# Patient Record
Sex: FEMALE | URBAN - NONMETROPOLITAN AREA
[De-identification: names, ages, dates, MRNs, and addresses within clinical notes are randomized per-mention and may not be internally consistent; named-entity substitution may affect disease eponyms.]

---

## 2023-07-17 LAB
EXTERNAL ABO GROUPING: NORMAL
EXTERNAL ANTIBODY SCREEN: NORMAL
EXTERNAL HEMATOCRIT: 42 %
EXTERNAL HEMOGLOBIN: 14 G/DL
EXTERNAL HEPATITIS B SURFACE ANTIGEN: NEGATIVE
EXTERNAL PLATELET COUNT: 296 K/ΜL
EXTERNAL RH FACTOR: POSITIVE
EXTERNAL SYPHILIS RPR SCREEN: NORMAL
HCV AB S/CO SERPL IA: NORMAL
HIV 1+2 AB+HIV1 P24 AG SERPL QL IA: NORMAL
RUBV IGG SERPL IA-ACNC: NORMAL
VZV IGG SER QL: 610.6

## 2023-07-31 LAB
2ND TRIMESTER 4 SCREEN SERPL-IMP: NEGATIVE
C TRACH RRNA SPEC DONR QL NAA+PROBE: NEGATIVE
N GONORRHOEA DNA SPEC QL NAA+PROBE: NEGATIVE

## 2023-08-03 ENCOUNTER — PATIENT OUTREACH (OUTPATIENT)
Dept: LABOR AND DELIVERY | Facility: HOSPITAL | Age: 28
End: 2023-08-03

## 2023-08-03 ENCOUNTER — REFERRAL TRIAGE (OUTPATIENT)
Dept: LABOR AND DELIVERY | Facility: HOSPITAL | Age: 28
End: 2023-08-03

## 2023-10-04 ENCOUNTER — INITIAL PRENATAL (OUTPATIENT)
Dept: OBSTETRICS AND GYNECOLOGY | Facility: CLINIC | Age: 28
End: 2023-10-04
Payer: COMMERCIAL

## 2023-10-04 VITALS
HEIGHT: 63 IN | SYSTOLIC BLOOD PRESSURE: 122 MMHG | DIASTOLIC BLOOD PRESSURE: 74 MMHG | WEIGHT: 191 LBS | BODY MASS INDEX: 33.84 KG/M2

## 2023-10-04 DIAGNOSIS — Z36.3 SCREENING, ANTENATAL, FOR MALFORMATION BY ULTRASOUND: ICD-10-CM

## 2023-10-04 DIAGNOSIS — O26.892 PELVIC PAIN AFFECTING PREGNANCY IN SECOND TRIMESTER, ANTEPARTUM: ICD-10-CM

## 2023-10-04 DIAGNOSIS — R10.2 PELVIC PAIN AFFECTING PREGNANCY IN SECOND TRIMESTER, ANTEPARTUM: ICD-10-CM

## 2023-10-04 DIAGNOSIS — Z3A.24 24 WEEKS GESTATION OF PREGNANCY: Primary | ICD-10-CM

## 2023-10-04 DIAGNOSIS — O09.899 RUBELLA NON-IMMUNE STATUS, ANTEPARTUM: ICD-10-CM

## 2023-10-04 DIAGNOSIS — Z71.85 IMMUNIZATION COUNSELING: ICD-10-CM

## 2023-10-04 DIAGNOSIS — Z28.39 RUBELLA NON-IMMUNE STATUS, ANTEPARTUM: ICD-10-CM

## 2023-10-04 DIAGNOSIS — Z78.9 NON-SMOKER: ICD-10-CM

## 2023-10-04 DIAGNOSIS — Z34.02 PRIMIGRAVIDA, SECOND TRIMESTER: ICD-10-CM

## 2023-10-04 PROBLEM — O99.210 OBESITY IN PREGNANCY, ANTEPARTUM: Status: ACTIVE | Noted: 2023-10-04

## 2023-10-04 PROBLEM — Z34.90 PREGNANCY: Status: ACTIVE | Noted: 2023-10-04

## 2023-10-04 PROBLEM — Z34.00 PRIMIGRAVIDA: Status: ACTIVE | Noted: 2023-10-04

## 2023-10-04 LAB
GLUCOSE UR STRIP-MCNC: NEGATIVE MG/DL
PROT UR STRIP-MCNC: NEGATIVE MG/DL

## 2023-10-04 PROCEDURE — 0501F PRENATAL FLOW SHEET: CPT | Performed by: ADVANCED PRACTICE MIDWIFE

## 2023-10-04 RX ORDER — CALCIUM CARBONATE 500 MG/1
1 TABLET, CHEWABLE ORAL DAILY
COMMUNITY

## 2023-10-04 RX ORDER — PRENATAL VIT NO.126/IRON/FOLIC 28MG-0.8MG
TABLET ORAL DAILY
COMMUNITY

## 2023-10-04 RX ORDER — ACETAMINOPHEN 500 MG
500 TABLET ORAL EVERY 6 HOURS PRN
COMMUNITY

## 2023-10-04 NOTE — PROGRESS NOTES
28-year old patient arrived to initiate prenatal care as a transfer of care at 24 weeks, 2 days gestation.    HPI: 28-year old . Patient's last menstrual period was 2023. She has been having some pelvic and hip pain. Pre-pregnancy weight of 170 pounds as reported by patient.    Previous prenatal history significant for: n/a - primigravida  History significant for: former smoker, obesity,     The following portion of the patient's history were reviewed and updated as needed: allergies, current medications, past family history, past medical history, social history, surgical history, and problem list.    ROS: All systems reviewed and are negative with exception of the following: pelvic pain, hip pain, sciatic nerve pain improved      Last Pap Smear: 2023 NILM    Exam:  Wt: 191 lb for TWG of 9.526 kg (21 lb), B/P 122/74, FHTs 149, FH 25 cm   General Appearance:  healthy-appearing .  HEENT:  NCAT, EOMI, neck supple, no thyroidmegaly.  HR str and reg. No murmur. Lungs clear. Resp even and unlabored.  Abd: Soft, nontender. Bowel sounds active. Uterus is consistent with EGA.  Ext: NT, nontender. No cyanosis or edema.    Diagnoses and all orders for this visit:    1. 24 weeks gestation of pregnancy (Primary)  -     ToxASSURE Select 13 (MW) - Urine, Clean Catch  -     Urine Culture - Urine, Urine, Random Void  -     POC Urinalysis Dipstick  -     Ambulatory Referral to [facility]/Perinatology    2. Primigravida, second trimester  - Reviewed information in new OB packet, including OTC medications for use during pregnancy, regular OB routine.  - Advised to maintain regular activity.  - Discussed second trimester of pregnancy, discomforts and measures of support, fetal movement,  labor warnings, preeclampsia warnings, and signs and symptoms to report. Second Trimester of Pregnancy video and Round Ligament Pain education included in the AVS.  - Discussed plan for glucose tolerance test and hemoglobin for  the next visit.  - Discussed and encouraged to call or come to the hospital for vaginal bleeding, leaking of fluid, contractions, or any other concerns.  - Return to the office in four weeks with 4D ultrasound and as needed with concerns.  -     Urine Culture - Urine, Urine, Random Void  -     Ambulatory Referral to [facility]/Perinatology    3. Screening, , for malformation by ultrasound  - Discussed plan of referral to perinatology to complete anatomy scan as not all anatomy visualized with in-office anatomy scan at previous provider's office. Anatomy scan to assess for possible anomalies or markers for aneuploidy.   -     Ambulatory Referral to [facility]/Perinatology    4. Rubella non-immune status, antepartum  - Plan to offer MMR postpartum.    5. Pelvic pain affecting pregnancy in second trimester, antepartum  - Discussed both pharmacologic (tylenol and analgesic rub) and nonpharmacologic (stretching, massage, chiropractor, maternity support belt, pillow support for sleep, warm shower or epsom salt bath, proper body mechanics) measures of support. Discussed also referral for OMT (osteopathic manipulation treatment) consult or referral for a physical therapy consultation for evaluation and treatment of pain in pregnancy. Patient to notify provider if symptoms persist or worsen. Back Pain in Pregnancy education included in the AVS.    6. Immunization counseling  - Discussed influenza vaccine recommendations during pregnancy. Patient declines to receive the influenza immunization today in the clinic. Influenza (Flu) Vaccine (Inactivated or Recombinant): What You Need to Know (VIS) education included in the AVS.    7. Non-smoker        This note has been signed electronically.     Basia Lamb, DNP, APRN, CNM, RNC-OB

## 2023-10-06 LAB
BACTERIA UR CULT: NO GROWTH
BACTERIA UR CULT: NORMAL

## 2023-10-11 LAB — DRUGS UR: NORMAL

## 2023-10-26 ENCOUNTER — HOSPITAL ENCOUNTER (OUTPATIENT)
Facility: HOSPITAL | Age: 28
Discharge: HOME | End: 2023-10-26
Attending: OBSTETRICS & GYNECOLOGY | Admitting: OBSTETRICS & GYNECOLOGY
Payer: COMMERCIAL

## 2023-10-26 VITALS
SYSTOLIC BLOOD PRESSURE: 117 MMHG | DIASTOLIC BLOOD PRESSURE: 77 MMHG | OXYGEN SATURATION: 100 % | RESPIRATION RATE: 16 BRPM | BODY MASS INDEX: 33.66 KG/M2 | WEIGHT: 190 LBS | HEIGHT: 63 IN | TEMPERATURE: 98 F | HEART RATE: 77 BPM

## 2023-10-26 LAB
BASOPHILS # BLD AUTO: 0.02 10*3/MM3 (ref 0–0.2)
BASOPHILS NFR BLD AUTO: 0.2 % (ref 0–1.5)
DEPRECATED RDW RBC AUTO: 41.8 FL (ref 37–54)
EOSINOPHIL # BLD AUTO: 0.07 10*3/MM3 (ref 0–0.4)
EOSINOPHIL NFR BLD AUTO: 0.6 % (ref 0.3–6.2)
ERYTHROCYTE [DISTWIDTH] IN BLOOD BY AUTOMATED COUNT: 12.8 % (ref 12.3–15.4)
HCT VFR BLD AUTO: 35.2 % (ref 34–46.6)
HGB BLD-MCNC: 11.7 G/DL (ref 12–15.9)
IMM GRANULOCYTES # BLD AUTO: 0.04 10*3/MM3 (ref 0–0.05)
IMM GRANULOCYTES NFR BLD AUTO: 0.4 % (ref 0–0.5)
LYMPHOCYTES # BLD AUTO: 1.14 10*3/MM3 (ref 0.7–3.1)
LYMPHOCYTES NFR BLD AUTO: 10.2 % (ref 19.6–45.3)
MCH RBC QN AUTO: 30 PG (ref 26.6–33)
MCHC RBC AUTO-ENTMCNC: 33.2 G/DL (ref 31.5–35.7)
MCV RBC AUTO: 90.3 FL (ref 79–97)
MONOCYTES # BLD AUTO: 0.54 10*3/MM3 (ref 0.1–0.9)
MONOCYTES NFR BLD AUTO: 4.8 % (ref 5–12)
NEUTROPHILS NFR BLD AUTO: 83.8 % (ref 42.7–76)
NEUTROPHILS NFR BLD AUTO: 9.39 10*3/MM3 (ref 1.7–7)
NRBC BLD AUTO-RTO: 0 /100 WBC (ref 0–0.2)
PLATELET # BLD AUTO: 259 10*3/MM3 (ref 140–450)
PMV BLD AUTO: 9.6 FL (ref 6–12)
RBC # BLD AUTO: 3.9 10*6/MM3 (ref 3.77–5.28)
WBC NRBC COR # BLD: 11.2 10*3/MM3 (ref 3.4–10.8)

## 2023-10-26 PROCEDURE — 25810000003 LACTATED RINGERS SOLUTION: Performed by: OBSTETRICS & GYNECOLOGY

## 2023-10-26 PROCEDURE — 85025 COMPLETE CBC W/AUTO DIFF WBC: CPT | Performed by: OBSTETRICS & GYNECOLOGY

## 2023-10-26 PROCEDURE — G0378 HOSPITAL OBSERVATION PER HR: HCPCS

## 2023-10-26 PROCEDURE — G0463 HOSPITAL OUTPT CLINIC VISIT: HCPCS

## 2023-10-26 PROCEDURE — 36415 COLL VENOUS BLD VENIPUNCTURE: CPT | Performed by: OBSTETRICS & GYNECOLOGY

## 2023-10-26 RX ORDER — SODIUM CHLORIDE 0.9 % (FLUSH) 0.9 %
10 SYRINGE (ML) INJECTION EVERY 12 HOURS SCHEDULED
Status: DISCONTINUED | OUTPATIENT
Start: 2023-10-26 | End: 2023-10-27 | Stop reason: HOSPADM

## 2023-10-26 RX ORDER — SODIUM CHLORIDE 0.9 % (FLUSH) 0.9 %
10 SYRINGE (ML) INJECTION AS NEEDED
Status: DISCONTINUED | OUTPATIENT
Start: 2023-10-26 | End: 2023-10-27 | Stop reason: HOSPADM

## 2023-10-26 RX ORDER — SIMETHICONE 80 MG
80 TABLET,CHEWABLE ORAL ONCE
COMMUNITY

## 2023-10-26 RX ORDER — FAMOTIDINE 10 MG
10 TABLET ORAL ONCE
COMMUNITY

## 2023-10-26 RX ADMIN — SODIUM CHLORIDE, POTASSIUM CHLORIDE, SODIUM LACTATE AND CALCIUM CHLORIDE 1000 ML: 600; 310; 30; 20 INJECTION, SOLUTION INTRAVENOUS at 20:36

## 2023-10-27 ENCOUNTER — TELEPHONE (OUTPATIENT)
Dept: OBSTETRICS AND GYNECOLOGY | Facility: CLINIC | Age: 28
End: 2023-10-27

## 2023-10-27 NOTE — TELEPHONE ENCOUNTER
Caller: Jesi Harrington     Relationship: SELF    Best call back number: [phone]    What is your medical concern? PATIENT WENT TO ER LAST NIGHT WITH PAIN IN BACK AND GOING AROUND TO STOMACH. THEY SUGGESTED SHE CALL AND SPEAK WITH CASANDRA ANTONIO TO SEE IF SHE NEEDS TO GET HER GALLBLADDER CHECKED OUT.     How long has this issue been going on? LAST NIGHT    Is your provider already aware of this issue? NO    PLEASE CALL   OK TO CALL ANYTIME  OK TO [facility]

## 2023-10-27 NOTE — NURSING NOTE
[facility] OB Medical Screening Exam Score Card    10/26/2023              BOX A -  Checklist findings CRITERIA Score  BOX C -   Checklist EXAM 1 CRITERIA   Headache  No Yes = 1   0  Dilation N/A 0-3 cm = 1  4-7 cm = 2  8-10 cm = 3   Vomiting No Yes = 1 0  Effacement N/A Greater than 50% = 2        Membranes:  TIME:  ROM N/A Ruptured = 3  Greater than 12 hours = 5   Visual Difficulties No Yes = 1 0  Contractions        Frequency None Less than 5 minutes   = 2    Greater than 5 minutes = 1   Epigastric Pain No Yes = 1 0  Duration N/A Greater than 40 seconds = 2        Intensity N/A Strong = 1   TOTAL Box A 3 or more = physician or CNM exam 0  Pattern None Regular = 1   BOX B -   Checklist Findings CRITERIA Score  Urge to Push No Yes = 3   PARA  If in labor and   Para 11 plus = 1 0  Maternal Temp 98.0 Greater than 100.4 = 5   Duration last labor less than 3 hours N/A If in labor and   Yes = 3 0  Maternal /77 Greater than 140/90 = 5  OR Less than 90/50 = 5   Prior  No If in labor and  Yes = 1 0  Maternal Respiration 16 Less than 12 = 2  OR  Greater than 20 = 2   Prenatal Care Yes If in labor and  No = 3 0            Significant Medical History NO Yes = 7   Prior Fetal Demise No If in labor and  Yes = 3 0  Maternal Trauma NO Yes = 10        Chano Bleeding NO Yes = 7   Multiple Gestation No If in labor and  Yes = 3 0  Fetal Heart Rate 130 Baseline less than 120 = 5    OR Greater than 160 = 5    Non-reassuring strip = 10   Cerclage, Incompetent Cervix/Uterus No If in labor and  Yes = 3 0       Urinalysis No If greater than  100 = 1 0  Fetal Position UNK Non-vertex = 3  Prolapsed part = 10   Gestational Age 27w3d 20-34 weeks = 3    34-36 weeks = 2    37 plus weeks = 0 3  Fetal Station N/A Greater than 0 station = 3        TOTAL Box C 0 10 or more = physician or CNM exam   TOTAL Box B 6 or more = physician or CNM exam 3         Reviewed with obstetrician and orders received.      Nan Carvalho,  RN  10/26/2023  21:58 ANITRAT

## 2023-10-30 ENCOUNTER — PATIENT OUTREACH (OUTPATIENT)
Dept: LABOR AND DELIVERY | Facility: HOSPITAL | Age: 28
End: 2023-10-30
Payer: COMMERCIAL

## 2023-10-30 NOTE — OUTREACH NOTE
[facility]  Check-In    Current Estimated Gestational Age: 28w0d      Questions/Answers      Flowsheet Row Responses   Best Method for Contacting Cell   Currently Employed Yes   Able to keep appointments as scheduled Yes   Gender(s) and Name(s) male   Baby Active/Feeling Fetal Movemen Yes   How are you presently feeling? voices no complaints   Special Considerations Birthing Ball, Peanut Ball   Resource/Environmental Concerns None   Do you have any questions related to your care experience, your pregnancy, plans for delivery, any concerns, etc? No          Resource letter with maternal warning signs, 1 in 5 rule related to uterine contractions, breastfeeding myths, and skin to skin [facility]     Kandy Moscoso RN  Maternity Nurse Navigator    10/30/2023, 13:16 CDT

## 2023-11-10 ENCOUNTER — ROUTINE PRENATAL (OUTPATIENT)
Dept: OBSTETRICS AND GYNECOLOGY | Facility: CLINIC | Age: 28
End: 2023-11-10
Payer: COMMERCIAL

## 2023-11-10 VITALS — WEIGHT: 191 LBS | SYSTOLIC BLOOD PRESSURE: 124 MMHG | BODY MASS INDEX: 33.83 KG/M2 | DIASTOLIC BLOOD PRESSURE: 68 MMHG

## 2023-11-10 DIAGNOSIS — Z23 NEED FOR TDAP VACCINATION: ICD-10-CM

## 2023-11-10 DIAGNOSIS — Z71.85 IMMUNIZATION COUNSELING: ICD-10-CM

## 2023-11-10 DIAGNOSIS — Z28.21 INFLUENZA VACCINATION DECLINED: ICD-10-CM

## 2023-11-10 DIAGNOSIS — Z13.1 SPECIAL SCREENING EXAMINATION FOR DIABETES MELLITUS: ICD-10-CM

## 2023-11-10 DIAGNOSIS — Z13.0 SCREENING FOR DEFICIENCY ANEMIA: ICD-10-CM

## 2023-11-10 DIAGNOSIS — Z34.03 PRIMIGRAVIDA, THIRD TRIMESTER: ICD-10-CM

## 2023-11-10 DIAGNOSIS — Z3A.29 29 WEEKS GESTATION OF PREGNANCY: Primary | ICD-10-CM

## 2023-11-10 RX ORDER — MAGNESIUM HYDROXIDE/ALUMINUM HYDROXICE/SIMETHICONE 120; 1200; 1200 MG/30ML; MG/30ML; MG/30ML
SUSPENSION ORAL EVERY 6 HOURS PRN
COMMUNITY

## 2023-11-10 NOTE — PROGRESS NOTES
Reason for visit: Routine OB visit at 29w4d     CC:  Patient reports good fetal movement. She is recently recovered from Covid. Denies VB, LOF, contractions, h/a, visual changes, and right upper quadrant pain.     ROS: All systems reviewed and are negative with exception of the following: cough, fatigue    Wt 191 lb for a TWG of 9.526 kg (21 lb), /68, FHTs 137, FH 29 cm  Urine: unable to void today    26-week Anatomy scan: EFW 31%; AC 54%; normal; anterior placenta without evidence of placenta previa    Exam:  General Appearance:  Healthy appearing . Normal mood and behavior.  HEENT: NCAT, EOMI  HR str and reg. Lungs clear. Resp even and unlabored.  Abdomen is soft and nontender. No CVA tenderness. Uterus is consistent with EGA  Ext: No edema, nontender, no trauma, or cyanosis.    Impression  Diagnoses and all orders for this visit:    1. 29 weeks gestation of pregnancy (Primary)  -     POC Urinalysis Dipstick  -     Gestational Screen 1 Hr ([facility])  -     Hemoglobin  -     Tdap Vaccine => 6yo IM (BOOSTRIX)    2. Primigravida, third trimester  - Discussed third trimester of pregnancy, including discomforts and measures of support,  labor warnings, preeclampsia warnings, and signs and symptoms to report. Third trimester of Pregnancy video included in the AVS.  - Discussed and encouraged to come to the hospital for vaginal bleeding, leaking of fluid, contractions, or any other concerns.  - Return to office in 2 weeks for routine OB visit with Dr. Ty and as needed with concerns.  - Discussed glucose screening and checking hemoglobin for anemia in third trimester for today.    3. Special screening examination for diabetes mellitus  -     Gestational Screen 1 Hr ([facility])    4. Screening for deficiency anemia  -     Hemoglobin    5. Immunization counseling  - Discussed influenza vaccine recommendations during pregnancy. Patient declines to receive the influenza immunization today in the clinic.  Influenza (Flu) Vaccine (Inactivated or Recombinant): What You Need to Know (VIS) education included in the AVS.  - Discussed Tdap immunization recommendations during pregnancy. Patient consents to receive the Tdap immunization during this clinic visit. Tdap (Tetanus, Diptheria, Pertussis) Vaccine: What You Need to Know (VIS) education included in the AVS.    6. Need for Tdap vaccination  -     Tdap Vaccine => 6yo IM (BOOSTRIX)    7. Influenza vaccination declined          This note has been signed electronically.    Basia Lamb, DNP, APRN, CNM, RNC-OB

## 2023-11-11 LAB
GLUCOSE 1H P 50 G GLC PO SERPL-MCNC: 90 MG/DL (ref 70–139)
HGB BLD-MCNC: 12.3 G/DL (ref 11.1–15.9)

## 2023-11-20 ENCOUNTER — ROUTINE PRENATAL (OUTPATIENT)
Dept: OBSTETRICS AND GYNECOLOGY | Facility: CLINIC | Age: 28
End: 2023-11-20
Payer: COMMERCIAL

## 2023-11-20 VITALS — DIASTOLIC BLOOD PRESSURE: 72 MMHG | SYSTOLIC BLOOD PRESSURE: 104 MMHG | WEIGHT: 192 LBS | BODY MASS INDEX: 34.01 KG/M2

## 2023-11-20 DIAGNOSIS — Z3A.31 31 WEEKS GESTATION OF PREGNANCY: Primary | ICD-10-CM

## 2023-11-20 LAB
GLUCOSE UR STRIP-MCNC: NEGATIVE MG/DL
PROT UR STRIP-MCNC: NEGATIVE MG/DL

## 2023-11-20 NOTE — PROGRESS NOTES
No complaints. GFM. Occa BH, but nothing regular. Denies LOF, VB.  No questions today. No PIH symptoms.     ROS negative    /72   Wt 87.1 kg (192 lb)   LMP 04/24/2023   BMI 34.01 kg/m²      FH 31  Urine protein negative, urine glucose negative    Diagnoses and all orders for this visit:    1. 31 weeks gestation of pregnancy (Primary)  -     POC Urinalysis Dipstick  IUP at 31+ weeks gestation, doing well. Normal 1 hour GTT. Tdap 11/10.  RTC 2 weeks.

## 2023-12-08 ENCOUNTER — ROUTINE PRENATAL (OUTPATIENT)
Dept: OBSTETRICS AND GYNECOLOGY | Facility: CLINIC | Age: 28
End: 2023-12-08
Payer: COMMERCIAL

## 2023-12-08 VITALS — BODY MASS INDEX: 34.72 KG/M2 | SYSTOLIC BLOOD PRESSURE: 114 MMHG | DIASTOLIC BLOOD PRESSURE: 72 MMHG | WEIGHT: 196 LBS

## 2023-12-08 DIAGNOSIS — Z34.03 PRIMIGRAVIDA, THIRD TRIMESTER: Primary | ICD-10-CM

## 2023-12-08 DIAGNOSIS — Z3A.33 33 WEEKS GESTATION OF PREGNANCY: ICD-10-CM

## 2023-12-08 DIAGNOSIS — Z71.85 IMMUNIZATION COUNSELING: ICD-10-CM

## 2023-12-08 DIAGNOSIS — Z28.21 INFLUENZA VACCINATION DECLINED: ICD-10-CM

## 2023-12-08 PROCEDURE — 0502F SUBSEQUENT PRENATAL CARE: CPT | Performed by: ADVANCED PRACTICE MIDWIFE

## 2023-12-08 NOTE — PROGRESS NOTES
Reason for visit: Routine OB visit at 33w4d     CC:  Patient reports good fetal movement. She is ready for the baby to arrive. Denies VB, LOF, contractions, h/a, visual changes, and right upper quadrant pain.     ROS: All systems reviewed and are negative.    Wt 196 lb for a TWG of 11.8 kg (26 lb), /72, FHTs 136, FH 35 cm  Urine: unable to leave urine today    26-week Anatomy scan: EFW 31%; AC 54%; normal; anterior placenta without evidence of placenta previa     Exam:  General Appearance:  Healthy appearing . Normal mood and behavior.  HEENT: NCAT, EOMI  HR str and reg. Lungs clear. Resp even and unlabored.  Abdomen is soft and nontender. No CVA tenderness. Uterus is consistent with EGA  Ext: no edema, nontender, no trauma, or cyanosis.    Impression  Diagnoses and all orders for this visit:    1. Primigravida, third trimester (Primary)  - Discussed third trimester of pregnancy, discomforts and measures of support, fetal movement counts,  labor warnings, preeclampsia warnings, and signs and symptoms to report. Third Trimester of Pregnancy, Signs and Symptoms of Labor, and Alternative Pain Management During Labor Delivery videos included in the AVS.  - Discussed and encouraged to come to the hospital or call with vaginal bleeding, leaking of fluid, contractions, or other concerns.  - Return to the office in two weeks with Dr. Ty with an interval growth ultrasound (uterine size greater than dates) for routine OBV and as needed with concerns.    2. 33 weeks gestation of pregnancy  - Discussed practicing measures of support for relaxation during the birthing experience, childbirth education classes,  breastfeeding classes, [employer] support group, lactation consultants and outpatient clinic, and choosing a pediatrician. Information provided on pediatric provders.  -     POC Urinalysis Dipstick    3. Immunization counseling  - Discussed RSV immunization recommendations between 32w0d  and 36w6d gestation, side effects, risks, and benefits. Patient will consider receiving the vaccine. Respiratory Syncytial Virus (RSV) Vaccine Injection education included in the AVS.     4. Influenza vaccination declined  - Discussed influenza vaccine recommendations during pregnancy. Patient declines to receive the influenza immunization today in the clinic. Influenza (Flu) Vaccine (Inactivated or Recombinant): What You Need to Know (VIS) education included in the AVS.          This note has been signed electronically.    Basia Lamb, DNP, APRN, CNM, RNC-OB

## 2023-12-19 ENCOUNTER — ROUTINE PRENATAL (OUTPATIENT)
Dept: OBSTETRICS AND GYNECOLOGY | Facility: CLINIC | Age: 28
End: 2023-12-19
Payer: COMMERCIAL

## 2023-12-19 ENCOUNTER — ANCILLARY ORDERS (OUTPATIENT)
Dept: OBSTETRICS AND GYNECOLOGY | Facility: CLINIC | Age: 28
End: 2023-12-19

## 2023-12-19 VITALS — WEIGHT: 198 LBS | SYSTOLIC BLOOD PRESSURE: 102 MMHG | BODY MASS INDEX: 35.07 KG/M2 | DIASTOLIC BLOOD PRESSURE: 62 MMHG

## 2023-12-19 DIAGNOSIS — Z3A.35 35 WEEKS GESTATION OF PREGNANCY: Primary | ICD-10-CM

## 2023-12-19 DIAGNOSIS — Z34.03 ENCOUNTER FOR SUPERVISION OF NORMAL FIRST PREGNANCY IN THIRD TRIMESTER: ICD-10-CM

## 2023-12-19 LAB
GLUCOSE UR STRIP-MCNC: NEGATIVE MG/DL
PROT UR STRIP-MCNC: ABNORMAL MG/DL

## 2023-12-19 PROCEDURE — 90678 RSV VACC PREF BIVALENT IM: CPT | Performed by: OBSTETRICS & GYNECOLOGY

## 2023-12-19 PROCEDURE — 90471 IMMUNIZATION ADMIN: CPT | Performed by: OBSTETRICS & GYNECOLOGY

## 2023-12-19 PROCEDURE — 0502F SUBSEQUENT PRENATAL CARE: CPT | Performed by: OBSTETRICS & GYNECOLOGY

## 2023-12-19 NOTE — PROGRESS NOTES
No complaints. Had a hemorrhoid last week. Discussed OTC medications that were safe.  GFM. Denies ctx, LOF, VB. Denies PIH symptoms.     /62   Wt 89.8 kg (198 lb)   LMP 04/24/2023   BMI 35.07 kg/m²    FH 36  FHTs 128  Urine protein trace  Urine glucose negative    US vertex/anterior placenta/EFW 2841g (73%)/AMILCAR 20.9 cm    Diagnoses and all orders for this visit:    1. 35 weeks gestation of pregnancy (Primary)  -     POC Urinalysis Dipstick    2. Encounter for supervision of normal first pregnancy in third trimester  IUP at 35+ weeks gestation, doing well. US today with EFW 2841g/normal AMILCAR.  Pt requested RSV vaccination today. RTC 1 week with GBS, Cx.

## 2023-12-21 ENCOUNTER — PATIENT OUTREACH (OUTPATIENT)
Dept: LABOR AND DELIVERY | Facility: HOSPITAL | Age: 28
End: 2023-12-21
Payer: COMMERCIAL

## 2023-12-21 NOTE — OUTREACH NOTE
[facility]    Client called and a tour is scheduled prior to her OB appt 12/28/2023 at 2:45 PM.  I gave her Rochelle Edwards RN office number for her to sign up for a child birth class.     Kandy Moscoso RN  Maternity Nurse Navigator    12/21/2023, 09:20 CST

## 2025-07-08 NOTE — TELEPHONE ENCOUNTER
Inserted over the wire into the RFA. Spoke with Dr. Mclean and he informed me to have pt come in if her symptoms worsen prior to her scheduled appt next week, however, she can f/u as scheduled with Basia and stick to a low fat diet to see if her symptoms improve with that.  Called and informed pt of all above and voiced understanding.